# Patient Record
Sex: FEMALE | URBAN - METROPOLITAN AREA
[De-identification: names, ages, dates, MRNs, and addresses within clinical notes are randomized per-mention and may not be internally consistent; named-entity substitution may affect disease eponyms.]

---

## 2020-11-05 ENCOUNTER — APPOINTMENT (RX ONLY)
Dept: URBAN - METROPOLITAN AREA CLINIC 55 | Facility: CLINIC | Age: 40
Setting detail: DERMATOLOGY
End: 2020-11-05

## 2020-11-05 DIAGNOSIS — Z41.9 ENCOUNTER FOR PROCEDURE FOR PURPOSES OTHER THAN REMEDYING HEALTH STATE, UNSPECIFIED: ICD-10-CM

## 2020-11-05 PROCEDURE — ? CLEAR AND BRILLIANT LASER

## 2020-11-05 ASSESSMENT — LOCATION SIMPLE DESCRIPTION DERM: LOCATION SIMPLE: INFERIOR FOREHEAD

## 2020-11-05 ASSESSMENT — LOCATION DETAILED DESCRIPTION DERM: LOCATION DETAILED: INFERIOR MID FOREHEAD

## 2020-11-05 ASSESSMENT — LOCATION ZONE DERM: LOCATION ZONE: FACE

## 2020-11-05 NOTE — PROCEDURE: CLEAR AND BRILLIANT LASER
Consent: Written consent obtained, risks reviewed including but not limited to crusting, scabbing, blistering, scarring, darker or lighter pigmentary change, incidental hair removal, bruising, and/or incomplete removal.
Total Pulses: 6 overlapping passes
Length Of Topical Anesthesia Application (Optional): 60 minutes
Detail Level: Zone
Laser Type: Clear and Brilliant 1440nm
Topical Anesthesia?: 20% benzocaine, 8% lidocaine, 4% tetracaine
Energy Level: High
Pre-Procedure Text: Wiped off blt with gauze and cleansed skin. Went over with alcohol.  Patient requested PRONOX while she is getting her treatment.  Patient used PRONOX intermittently for 30 min while treatment being performed.
Post-Care Instructions: I reviewed with the patient in detail post-care instructions. Patient should stay away from the sun and wear sun protection until treated areas are fully healed.
Post-Procedure Care: C&E ferulic acid, Fusion spf 50. Post care reviewed with patient.

## 2020-11-24 ENCOUNTER — RX ONLY (OUTPATIENT)
Age: 40
Setting detail: RX ONLY
End: 2020-11-24

## 2020-11-24 ENCOUNTER — APPOINTMENT (RX ONLY)
Dept: URBAN - METROPOLITAN AREA CLINIC 55 | Facility: CLINIC | Age: 40
Setting detail: DERMATOLOGY
End: 2020-11-24

## 2020-11-24 DIAGNOSIS — Z41.9 ENCOUNTER FOR PROCEDURE FOR PURPOSES OTHER THAN REMEDYING HEALTH STATE, UNSPECIFIED: ICD-10-CM

## 2020-11-24 PROCEDURE — ? COSMETIC CONSULTATION: GENERAL

## 2020-11-24 RX ORDER — HYDROCODONE BITARTRATE AND ACETAMINOPHEN 5; 325 MG/1; MG/1
TABLET ORAL
Qty: 2 | Refills: 0 | Status: ERX | COMMUNITY
Start: 2020-11-24

## 2020-11-24 RX ORDER — ALPRAZOLAM 1 MG/1
TABLET ORAL
Qty: 1 | Refills: 0 | Status: ERX | COMMUNITY
Start: 2020-11-24

## 2020-12-03 ENCOUNTER — APPOINTMENT (RX ONLY)
Dept: URBAN - METROPOLITAN AREA CLINIC 55 | Facility: CLINIC | Age: 40
Setting detail: DERMATOLOGY
End: 2020-12-03

## 2020-12-03 DIAGNOSIS — Z41.9 ENCOUNTER FOR PROCEDURE FOR PURPOSES OTHER THAN REMEDYING HEALTH STATE, UNSPECIFIED: ICD-10-CM

## 2020-12-03 PROCEDURE — ? PALOMAR VECTUS LASER HAIR REMOVAL

## 2020-12-03 PROCEDURE — ? BOTOX

## 2020-12-03 NOTE — PROCEDURE: PALOMAR VECTUS LASER HAIR REMOVAL
Post-Care Instructions: I reviewed with the patient in detail post-care instructions. Patient should avoid sun for a minimum of 4 weeks before and after treatment.
Fluence In J/Cm2: 24
External Cooling Fan Speed: 0
Rate (Hz): Medium
Post-Procedure Care: Immediate endpoint: perifollicular erythema and edema. Post care was reviewed with the patient and all patient questions were answered to their satisfaction.
Optic Size: 12 x 12mm
Consent: Written consent obtained, risks reviewed including but not limited to crusting, scabbing, blistering, scarring, darker or lighter pigmentary change, paradoxical hair regrowth, incomplete removal of hair and infection.
Pulse Duration (Include Units): 44 ms
Laser Type: Alexandrite 755nm
Pulse Duration (Include Units): 17 ms
Fluence In J/Cm2: 26
Render Post-Care In The Note: No
Pre-Procedure: Prior to proceeding the treatment areas were cleaned and all present put on their eye protection.
Detail Level: Detailed
Treatment Number: 4
Location Override: Brazilian
Pulse Duration (Include Units): 16 ms
Fluence In J/Cm2: 12

## 2020-12-03 NOTE — PROCEDURE: BOTOX
Show Masseter Units: Yes
Post-Care Instructions: Patient instructed to not lie down for 4 hours and limit physical activity for 24 hours.
Nasal Root Units: 0
Glabellar Complex Units: 16
Additional Area 2 Location: Upper lip
Lot #: [id number]
Detail Level: Detailed
Show Right And Left Pupillary Line Units: No
Consent: Written consent obtained. Risks include but not limited to lid/brow ptosis, bruising, swelling, diplopia, temporary effect, incomplete chemical denervation.
Dilution (U/0.1 Cc): 4
Additional Area 1 Location: Lateral Brow
Expiration Date (Month Year): 9/30/2023

## 2020-12-11 ENCOUNTER — APPOINTMENT (RX ONLY)
Dept: URBAN - METROPOLITAN AREA CLINIC 55 | Facility: CLINIC | Age: 40
Setting detail: DERMATOLOGY
End: 2020-12-11

## 2020-12-11 DIAGNOSIS — Z41.9 ENCOUNTER FOR PROCEDURE FOR PURPOSES OTHER THAN REMEDYING HEALTH STATE, UNSPECIFIED: ICD-10-CM

## 2020-12-11 PROCEDURE — ? OTHER (COSMETIC)

## 2020-12-11 PROCEDURE — ? COSMETIC CONSULTATION: GENERAL

## 2021-01-12 ENCOUNTER — APPOINTMENT (RX ONLY)
Dept: URBAN - METROPOLITAN AREA CLINIC 55 | Facility: CLINIC | Age: 41
Setting detail: DERMATOLOGY
End: 2021-01-12

## 2021-01-12 DIAGNOSIS — Z41.9 ENCOUNTER FOR PROCEDURE FOR PURPOSES OTHER THAN REMEDYING HEALTH STATE, UNSPECIFIED: ICD-10-CM

## 2021-01-12 PROCEDURE — ? ULTHERAPY

## 2021-01-12 NOTE — PROCEDURE: ULTHERAPY
Treatment Number (Optional): 1
Anesthesia Type: 1% lidocaine with epinephrine
Patient Reported Discomfort: 2
Detail Level: Zone
Use Distraction Techniques In Note: No
Post-Care Instructions: I reviewed with the patient in detail post-care instructions. Patient should stay away from the sun and wear sun protection until treated areas are fully healed.
Anesthesia Volume In Cc: 6
Pre-Procedures Photographs: Yes
Other: See attachment for further documentation.
Consent: Written consent obtained, risks reviewed including but not limited to crusting, scabbing, blistering, scarring, darker or lighter pigmentary change, and/or incomplete improvement.

## 2021-01-14 ENCOUNTER — APPOINTMENT (RX ONLY)
Dept: URBAN - METROPOLITAN AREA CLINIC 55 | Facility: CLINIC | Age: 41
Setting detail: DERMATOLOGY
End: 2021-01-14

## 2021-01-14 DIAGNOSIS — Z41.9 ENCOUNTER FOR PROCEDURE FOR PURPOSES OTHER THAN REMEDYING HEALTH STATE, UNSPECIFIED: ICD-10-CM

## 2021-01-14 PROCEDURE — ? PALOMAR VECTUS LASER HAIR REMOVAL

## 2021-01-14 NOTE — PROCEDURE: PALOMAR VECTUS LASER HAIR REMOVAL
Optic Size: 12 x 12mm
Pre-Procedure: Prior to proceeding the treatment areas were cleaned and all present put on their eye protection.
Treatment Number: 5
Consent: Written consent obtained, risks reviewed including but not limited to crusting, scabbing, blistering, scarring, darker or lighter pigmentary change, paradoxical hair regrowth, incomplete removal of hair and infection.
Pulse Duration (Include Units): 17 ms
External Cooling Fan Speed: 0
Post-Care Instructions: I reviewed with the patient in detail post-care instructions. Patient should avoid sun for a minimum of 4 weeks before and after treatment.
Fluence In J/Cm2: 26
Post-Procedure Care: Immediate endpoint: perifollicular erythema and edema. Post care was reviewed with the patient and all patient questions were answered to their satisfaction.
Location Override: Brazilian
Laser Type: diode 810nm
Rate (Hz): Medium
Render Post-Care In The Note: No
Detail Level: Detailed

## 2021-01-18 ENCOUNTER — APPOINTMENT (RX ONLY)
Dept: URBAN - METROPOLITAN AREA CLINIC 55 | Facility: CLINIC | Age: 41
Setting detail: DERMATOLOGY
End: 2021-01-18

## 2021-01-18 DIAGNOSIS — Z41.9 ENCOUNTER FOR PROCEDURE FOR PURPOSES OTHER THAN REMEDYING HEALTH STATE, UNSPECIFIED: ICD-10-CM

## 2021-01-18 PROCEDURE — ? COOLSCULPTING

## 2021-01-18 NOTE — PROCEDURE: COOLSCULPTING
Detail Level: Simple
Time (Minutes - Will Only Render If Nonzero): 35
Suction Settings: The suction settings were per protocol.
Applicator Size: standard applicator
Location 4: left arm
Time (Minutes - Will Only Render If Nonzero): 0
Post Treatment: After treatment, the applicator was removed from the skin. 2 minute immediate post procedure massage was administered.
Intro: Prior to treatment, the area was cleaned with alcohol and marked out with a marking pen. The gel sheet was then applied uniformly. The applicator was applied to the skin with good contact.
Patient Weight (Optional): 173
Applicator Size: CoolAdvantage Core
Applicator Size: CoolAdvantage Fit
Device: Coolsculpting
Location 1: right arm
Consent: verbal consent obtained, risks reviewed including, but not limited to, blistering from suction, darker or lighter pigmentary change, bruising, and/or need for multiple treatments.

## 2021-02-01 ENCOUNTER — APPOINTMENT (RX ONLY)
Dept: URBAN - METROPOLITAN AREA CLINIC 55 | Facility: CLINIC | Age: 41
Setting detail: DERMATOLOGY
End: 2021-02-01

## 2021-02-01 DIAGNOSIS — Z41.9 ENCOUNTER FOR PROCEDURE FOR PURPOSES OTHER THAN REMEDYING HEALTH STATE, UNSPECIFIED: ICD-10-CM

## 2021-02-01 PROCEDURE — ? COOLSCULPTING

## 2021-02-01 NOTE — PROCEDURE: COOLSCULPTING
Location 2: inner thigh (left)
Applicator Size: CoolAdvantage Core
Suction Settings: The suction settings were per protocol.
Intro: Prior to treatment, the area was cleaned with alcohol and marked out with a marking pen. The gel sheet was then applied uniformly. The applicator was applied to the skin with good contact.
Time (Minutes - Will Only Render If Nonzero): 35
Post Treatment: After treatment, the applicator was removed from the skin. 2 minute immediate post procedure massage was administered.
Applicator Size: standard applicator
Time (Minutes - Will Only Render If Nonzero): 0
Detail Level: Simple
Location 1: inner thigh (right)
Device: Coolsculpting
Applicator Size: CoolAdvantage Fit
Consent: verbal consent obtained, risks reviewed including, but not limited to, blistering from suction, darker or lighter pigmentary change, bruising, and/or need for multiple treatments.

## 2021-04-14 ENCOUNTER — APPOINTMENT (RX ONLY)
Dept: URBAN - METROPOLITAN AREA CLINIC 55 | Facility: CLINIC | Age: 41
Setting detail: DERMATOLOGY
End: 2021-04-14

## 2021-04-14 DIAGNOSIS — Z41.9 ENCOUNTER FOR PROCEDURE FOR PURPOSES OTHER THAN REMEDYING HEALTH STATE, UNSPECIFIED: ICD-10-CM

## 2021-04-14 PROCEDURE — ? COOLSCULPTING

## 2021-04-14 NOTE — PROCEDURE: COOLSCULPTING
Applicator Size: standard applicator
Time (Minutes - Will Only Render If Nonzero): 35
Treatment Administered By (Optional): Nanci
Consent: verbal consent obtained, risks reviewed including, but not limited to, blistering from suction, darker or lighter pigmentary change, bruising, and/or need for multiple treatments.
Location 1: right arm
Detail Level: Simple
Applicator Size: CoolAdvantage Petite Core
Suction Settings: The suction settings were per protocol.
Applicator Size: CoolAdvantage Curve
Applicator Size: CoolAdvantage Fit
Location 3: upper back fat pad (left)
Intro: Prior to treatment, the area was cleaned with alcohol and marked out with a marking pen. The gel sheet was then applied uniformly. The applicator was applied to the skin with good contact.
Location 2: left arm
Device: Coolsculpting
Patient Weight (Optional): 167
Post Treatment: After treatment, the applicator was removed from the skin. 2 minute immediate post procedure massage was administered.
Time (Minutes - Will Only Render If Nonzero): 0
Location 4: upper back fat pad (right)

## 2021-04-16 ENCOUNTER — APPOINTMENT (RX ONLY)
Dept: URBAN - METROPOLITAN AREA CLINIC 55 | Facility: CLINIC | Age: 41
Setting detail: DERMATOLOGY
End: 2021-04-16

## 2021-04-16 DIAGNOSIS — Z41.9 ENCOUNTER FOR PROCEDURE FOR PURPOSES OTHER THAN REMEDYING HEALTH STATE, UNSPECIFIED: ICD-10-CM

## 2021-04-16 PROCEDURE — ? BOTOX

## 2021-04-16 NOTE — PROCEDURE: BOTOX
Show Additional Area 4: Yes
Show Lcl Units: No
Right Pupillary Line Units: 0
Consent: Written consent obtained. Risks include but not limited to lid/brow ptosis, bruising, swelling, diplopia, temporary effect, incomplete chemical denervation.
Periorbital Skin Units: 16
Additional Area 3 Location: Chin
Additional Area 2 Location: Upper lip
Lot #: [id number]
Detail Level: Detailed
Additional Area 1 Location: Left Lateral Brow
Expiration Date (Month Year): 12/2023
Dilution (U/0.1 Cc): 4
Post-Care Instructions: Patient instructed to not lie down for 4 hours and limit physical activity for 24 hours.

## 2021-05-08 ENCOUNTER — APPOINTMENT (RX ONLY)
Dept: URBAN - METROPOLITAN AREA CLINIC 55 | Facility: CLINIC | Age: 41
Setting detail: DERMATOLOGY
End: 2021-05-08

## 2021-05-08 DIAGNOSIS — Z41.9 ENCOUNTER FOR PROCEDURE FOR PURPOSES OTHER THAN REMEDYING HEALTH STATE, UNSPECIFIED: ICD-10-CM

## 2021-05-08 PROCEDURE — ? PALOMAR VECTUS LASER HAIR REMOVAL

## 2021-05-08 NOTE — PROCEDURE: PALOMAR VECTUS LASER HAIR REMOVAL
Pulse Duration (Include Units): 16 ms
Consent: Written consent obtained, risks reviewed including but not limited to crusting, scabbing, blistering, scarring, darker or lighter pigmentary change, paradoxical hair regrowth, incomplete removal of hair and infection.
Pre-Procedure: Prior to proceeding the treatment areas were cleaned and all present put on their eye protection.
Laser Type: diode 810nm
Render Post-Care In The Note: No
Detail Level: Detailed
External Cooling Fan Speed: 0
Rate (Hz): Medium
Fluence In J/Cm2: 24
Post-Procedure Care: Immediate endpoint: perifollicular erythema and edema. Post care was reviewed with the patient and all patient questions were answered to their satisfaction.
Location Override: Brazilian
Post-Care Instructions: I reviewed with the patient in detail post-care instructions. Patient should avoid sun for a minimum of 4 weeks before and after treatment.
Optic Size: 12 x 12mm
Treatment Number: 6

## 2021-06-08 ENCOUNTER — APPOINTMENT (RX ONLY)
Dept: URBAN - METROPOLITAN AREA CLINIC 55 | Facility: CLINIC | Age: 41
Setting detail: DERMATOLOGY
End: 2021-06-08

## 2021-06-08 DIAGNOSIS — Z41.9 ENCOUNTER FOR PROCEDURE FOR PURPOSES OTHER THAN REMEDYING HEALTH STATE, UNSPECIFIED: ICD-10-CM

## 2021-06-08 PROCEDURE — ? COOLSCULPTING

## 2021-06-08 NOTE — PROCEDURE: COOLSCULPTING
Time (Minutes - Will Only Render If Nonzero): 35
Consent obtained, risks reviewed.
Applicator Size: CoolAdvantage Fit
Applicator Size: CoolAdvantage Curve
Time (Minutes - Will Only Render If Nonzero): 0
Suction Settings: The suction settings were per protocol.
Treatment Administered By (Optional): Nanci
Detail Level: Simple
Location 1: inner thigh (left)
Applicator Size: CoolAdvantage Core
Patient Weight (Optional): 168
Applicator Size: standard applicator
Device: Coolsculpting
Post Treatment: After treatment, the applicator was removed from the skin. 2 minute immediate post procedure massage was administered.
Price (Use Numbers Only, No Special Characters Or $): 1000
Intro: Prior to treatment, the area was cleaned with alcohol and marked out with a marking pen. The gel sheet was then applied uniformly. The applicator was applied to the skin with good contact.
Location 2: inner thigh (right)

## 2021-06-15 ENCOUNTER — APPOINTMENT (RX ONLY)
Dept: URBAN - METROPOLITAN AREA CLINIC 55 | Facility: CLINIC | Age: 41
Setting detail: DERMATOLOGY
End: 2021-06-15

## 2021-06-15 DIAGNOSIS — Z41.9 ENCOUNTER FOR PROCEDURE FOR PURPOSES OTHER THAN REMEDYING HEALTH STATE, UNSPECIFIED: ICD-10-CM

## 2021-06-15 PROCEDURE — ? COOLSCULPTING

## 2021-06-15 NOTE — PROCEDURE: COOLSCULPTING
Time (Minutes - Will Only Render If Nonzero): 35
Applicator Size: CoolAdvantage Curve
Suction Settings: The suction settings were per protocol.
Time (Minutes - Will Only Render If Nonzero): 0
Applicator Size: standard applicator
Consent obtained, risks reviewed.
Applicator Size: CoolAdvantage Core
Intro: Prior to treatment, the area was cleaned with alcohol and marked out with a marking pen. The gel sheet was then applied uniformly. The applicator was applied to the skin with good contact.
Device: Coolsculpting
Applicator Size: Faveeo PRO applicator
Post Treatment: After treatment, the applicator was removed from the skin. 2 minute immediate post procedure massage was administered.
Price (Use Numbers Only, No Special Characters Or $): 1000
Time (Minutes - Will Only Render If Nonzero): 75
Detail Level: Simple
Location 2: flank (left)
Applicator Size: CoolAdvantage Fit
Location 1: flank (right)
Treatment Administered By (Optional): Nanci

## 2021-07-13 ENCOUNTER — RX ONLY (OUTPATIENT)
Age: 41
Setting detail: RX ONLY
End: 2021-07-13

## 2021-08-18 ENCOUNTER — APPOINTMENT (RX ONLY)
Dept: URBAN - METROPOLITAN AREA CLINIC 55 | Facility: CLINIC | Age: 41
Setting detail: DERMATOLOGY
End: 2021-08-18

## 2021-08-18 DIAGNOSIS — Z41.9 ENCOUNTER FOR PROCEDURE FOR PURPOSES OTHER THAN REMEDYING HEALTH STATE, UNSPECIFIED: ICD-10-CM

## 2021-08-18 PROCEDURE — ? BOTOX

## 2021-08-18 NOTE — PROCEDURE: BOTOX
Show Mentalis Units: No
Anterior Platysmal Bands Units: 0
Show Anterior Platysmal Band Units: Yes
Additional Area 2 Location: Upper lip
Additional Area 3 Location: Chin
Glabellar Complex Units: 16
Dilution (U/0.1 Cc): 4
Additional Area 1 Location: Left Lateral Brow
Lot #: [id number]
Consent: Written consent obtained. Risks include but not limited to lid/brow ptosis, bruising, swelling, diplopia, temporary effect, incomplete chemical denervation.
Post-Care Instructions: Patient instructed to not lie down for 4 hours and limit physical activity for 24 hours.
Detail Level: Detailed
Expiration Date (Month Year): 11/2023

## 2022-01-25 ENCOUNTER — APPOINTMENT (RX ONLY)
Dept: URBAN - METROPOLITAN AREA CLINIC 55 | Facility: CLINIC | Age: 42
Setting detail: DERMATOLOGY
End: 2022-01-25

## 2022-01-25 DIAGNOSIS — Z41.9 ENCOUNTER FOR PROCEDURE FOR PURPOSES OTHER THAN REMEDYING HEALTH STATE, UNSPECIFIED: ICD-10-CM

## 2022-01-25 PROCEDURE — ? BOTOX

## 2022-01-25 NOTE — PROCEDURE: BOTOX
Show Glabellar Units: Yes
Right Periorbital Skin Units: 0
Additional Area 2 Location: Upper lip
Show Right And Left Brow Units: No
Additional Area 4 Location: upper cutaneous lip
Additional Area 3 Location: Chin
Dilution (U/0.1 Cc): 4
Consent: Written consent obtained. Risks include but not limited to lid/brow ptosis, bruising, swelling, diplopia, temporary effect, incomplete chemical denervation.
Detail Level: Detailed
Lot #: [id number]
Additional Area 1 Location: Left Lateral Brow
Periorbital Skin Units: 16
Post-Care Instructions: Patient instructed to not lie down for 4 hours and limit physical activity for 24 hours.
Expiration Date (Month Year): 4/2024

## 2022-06-22 ENCOUNTER — APPOINTMENT (RX ONLY)
Dept: URBAN - METROPOLITAN AREA CLINIC 55 | Facility: CLINIC | Age: 42
Setting detail: DERMATOLOGY
End: 2022-06-22

## 2022-06-22 DIAGNOSIS — Z41.9 ENCOUNTER FOR PROCEDURE FOR PURPOSES OTHER THAN REMEDYING HEALTH STATE, UNSPECIFIED: ICD-10-CM

## 2022-06-22 PROCEDURE — ? BOTOX

## 2022-06-22 PROCEDURE — ? COSMETIC CONSULTATION: GENERAL

## 2022-06-22 NOTE — PROCEDURE: BOTOX
Post-Care Instructions: Patient instructed to not lie down for 4 hours and limit physical activity for 24 hours.
Show Additional Area 1: Yes
Additional Area 2 Location: Upper lip
Lateral Platysmal Bands Units: 0
Show Lcl Units: No
Glabellar Complex Units: 16
Expiration Date (Month Year): 4/2024
Lot #: [id number]
Additional Area 3 Location: Chin
Additional Area 5 Location: lower eye lids
Consent: Written consent obtained. Risks include but not limited to lid/brow ptosis, bruising, swelling, diplopia, temporary effect, incomplete chemical denervation.
Forehead Units: 4
Detail Level: Detailed
Additional Area 1 Location: Right Lateral Brow

## 2022-07-01 ENCOUNTER — APPOINTMENT (RX ONLY)
Dept: URBAN - METROPOLITAN AREA CLINIC 55 | Facility: CLINIC | Age: 42
Setting detail: DERMATOLOGY
End: 2022-07-01

## 2022-07-01 DIAGNOSIS — Z41.9 ENCOUNTER FOR PROCEDURE FOR PURPOSES OTHER THAN REMEDYING HEALTH STATE, UNSPECIFIED: ICD-10-CM

## 2022-07-01 PROCEDURE — ? FILLERS

## 2022-07-01 NOTE — PROCEDURE: FILLERS
Cheeks Filler Volume In Cc: 0
Include Cannula Information In Note?: No
Map Statment: See Attach Map for Complete Details
Expiration Date (Month Year): 5/31/2024
Post-Care Instructions: Patient instructed to apply ice to reduce swelling.
Include Cannula Information In Note?: Yes
Additional Area 1 Location: lip body
Include Cannula Size?: 27G
Anesthesia Volume In Cc: 0.5
Lot #: [id number]
Additional Anesthesia Volume In Cc: 6
Expiration Date (Month Year): 10/31/2023
Lot #: [id number]
Filler: Luciana Hebert
Anesthesia Type: 1% lidocaine with epinephrine
Detail Level: Detailed
Consent: Verbal consent obtained. Risks include but not limited to bruising, beading, irregular texture, ulceration, infection, allergic reaction, scar formation, incomplete augmentation, temporary nature, procedural pain.
Expiration Date (Month Year): 11/11/2022
Aspiration Statement: Aspiration was performed prior to injecting site with filler.
Lot #: [id number]

## 2022-07-19 ENCOUNTER — APPOINTMENT (RX ONLY)
Dept: URBAN - METROPOLITAN AREA CLINIC 55 | Facility: CLINIC | Age: 42
Setting detail: DERMATOLOGY
End: 2022-07-19

## 2022-07-19 DIAGNOSIS — Z41.9 ENCOUNTER FOR PROCEDURE FOR PURPOSES OTHER THAN REMEDYING HEALTH STATE, UNSPECIFIED: ICD-10-CM

## 2022-07-19 PROCEDURE — ? FILLERS

## 2022-07-19 NOTE — PROCEDURE: FILLERS
Brows Filler Volume In Cc: 0
Include Cannula Size?: 27G
Consent: Verbal consent obtained. Risks include but not limited to bruising, beading, irregular texture, ulceration, infection, allergic reaction, scar formation, incomplete augmentation, temporary nature, procedural pain.
Lot #: [id number]
Additional Area 1 Location: lip body
Anesthesia Volume In Cc: 0.5
Expiration Date (Month Year): 5/31/2024
Aspiration Statement: Aspiration was performed prior to injecting site with filler.
Post-Care Instructions: Patient instructed to apply ice to reduce swelling.
Include Cannula Information In Note?: Yes
Anesthesia Type: 1% lidocaine with epinephrine
Map Statment: See Attach Map for Complete Details
Detail Level: Detailed
Expiration Date (Month Year): 9/30/2024
Filler: Restylane Kysse
Additional Anesthesia Volume In Cc: 6
Expiration Date (Month Year): 11/11/2022
Use Map Statement For Sites (Optional): No
Lot #: [id number]
Lot #: [id number]

## 2022-12-13 ENCOUNTER — APPOINTMENT (RX ONLY)
Dept: URBAN - METROPOLITAN AREA CLINIC 55 | Facility: CLINIC | Age: 42
Setting detail: DERMATOLOGY
End: 2022-12-13

## 2022-12-13 DIAGNOSIS — Z41.9 ENCOUNTER FOR PROCEDURE FOR PURPOSES OTHER THAN REMEDYING HEALTH STATE, UNSPECIFIED: ICD-10-CM

## 2022-12-13 PROCEDURE — ? BOTOX

## 2022-12-13 NOTE — PROCEDURE: BOTOX
Show Topical Anesthesia: Yes
Show Right And Left Brow Units: No
Dilution (U/0.1 Cc): 4
Additional Area 3 Units: 0
Glabellar Complex Units: 16
Additional Area 1 Location: upper lip
Detail Level: Detailed
Additional Area 2 Location: chin
Show Inventory Tab: Show
Post-Care Instructions: Patient instructed to not lie down for 4 hours and limit physical activity for 24 hours.
Consent: Verbal consent obtained. Risks include but not limited to lid/brow ptosis, bruising, swelling, diplopia, temporary effect, incomplete chemical denervation.

## 2023-01-27 ENCOUNTER — RX ONLY (OUTPATIENT)
Age: 43
Setting detail: RX ONLY
End: 2023-01-27

## 2023-01-27 RX ORDER — HYDROCODONE BITARTRATE AND ACETAMINOPHEN 5; 325 MG/1; MG/1
TABLET ORAL
Qty: 2 | Refills: 0 | Status: ERX | COMMUNITY
Start: 2023-01-27

## 2023-01-27 RX ORDER — ALPRAZOLAM 1 MG/1
TABLET ORAL
Qty: 1 | Refills: 0 | Status: ERX | COMMUNITY
Start: 2023-01-27

## 2023-01-31 ENCOUNTER — APPOINTMENT (RX ONLY)
Dept: URBAN - METROPOLITAN AREA CLINIC 55 | Facility: CLINIC | Age: 43
Setting detail: DERMATOLOGY
End: 2023-01-31

## 2023-01-31 DIAGNOSIS — Z41.9 ENCOUNTER FOR PROCEDURE FOR PURPOSES OTHER THAN REMEDYING HEALTH STATE, UNSPECIFIED: ICD-10-CM

## 2023-01-31 PROCEDURE — ? INVENTORY

## 2023-01-31 PROCEDURE — ? PRO-NOX

## 2023-01-31 PROCEDURE — ? ULTHERAPY

## 2023-01-31 NOTE — PROCEDURE: PRO-NOX
Consent obtained, risks reviewed.
Post-Care Instructions: Post care reviewed. Ice pack provided.
Detail Level: Zone
Total Time (Leave Blank If Not Wanted): 130 mins
Pre-Procedure Text: The patient was connected to the Pro-Nox machine and positioned appropriately for the anticipated procedure.  Following the procedure they were disconnected and monitored for any lasting side effects prior to leaving the office.

## 2023-05-16 ENCOUNTER — APPOINTMENT (RX ONLY)
Dept: URBAN - METROPOLITAN AREA CLINIC 55 | Facility: CLINIC | Age: 43
Setting detail: DERMATOLOGY
End: 2023-05-16

## 2023-05-16 DIAGNOSIS — Z41.9 ENCOUNTER FOR PROCEDURE FOR PURPOSES OTHER THAN REMEDYING HEALTH STATE, UNSPECIFIED: ICD-10-CM

## 2023-05-16 PROCEDURE — ? BOTOX

## 2023-05-16 NOTE — PROCEDURE: BOTOX
Left Periorbital Skin Units: 0
Show Ucl Units: No
Incrementing Botox Units: By 0.5 Units
Show Masseter Units: Yes
Detail Level: Detailed
Additional Area 2 Location: chin
Forehead Units: 4
Glabellar Complex Units: 16
Show Inventory Tab: Show
Additional Area 1 Location: upper lip
Consent: Verbal consent obtained. Risks include but not limited to lid/brow ptosis, bruising, swelling, diplopia, temporary effect, incomplete chemical denervation.
Post-Care Instructions: Patient instructed to not lie down for 4 hours and limit physical activity for 24 hours.
Additional Area 3 Location: lower lip

## 2023-06-05 ENCOUNTER — APPOINTMENT (RX ONLY)
Dept: URBAN - METROPOLITAN AREA CLINIC 55 | Facility: CLINIC | Age: 43
Setting detail: DERMATOLOGY
End: 2023-06-05

## 2023-06-05 DIAGNOSIS — Z41.9 ENCOUNTER FOR PROCEDURE FOR PURPOSES OTHER THAN REMEDYING HEALTH STATE, UNSPECIFIED: ICD-10-CM

## 2023-06-05 PROCEDURE — ? FILLERS

## 2023-06-05 PROCEDURE — ? INVENTORY

## 2023-06-05 PROCEDURE — ? DYSPORT

## 2023-06-05 NOTE — PROCEDURE: FILLERS
Additional Area 3 Volume In Cc: 0
Include Cannula Information In Note?: Yes
Anesthesia Volume In Cc: 0.5
Anesthesia Type: 1% lidocaine with epinephrine
Filler: Restylane Contour
Include Cannula Information In Note?: No
Include Cannula Size?: 25G
Map Statment: See Attach Map for Complete Details
Post-Care Instructions: After the procedure, patient instructed to apply ice to reduce swelling.
Consent: Written consent obtained. Risks include but not limited to bruising, beading, irregular texture, ulceration, infection, allergic reaction, scar formation, incomplete augmentation, temporary nature, and procedural pain.
Detail Level: Detailed
Additional Anesthesia Volume In Cc: 6

## 2023-06-05 NOTE — PROCEDURE: DYSPORT
Show Lcl Units: No
Periorbital Skin Units: 0
Post-Care Instructions: Patient instructed to not lie down for 4 hours and limit physical activity for 24 hours.
Show Anterior Platysmal Band Units: Yes
Consent: Verbal consent obtained. Risks include but not limited to lid/brow ptosis, bruising, swelling, diplopia, temporary effect, incomplete chemical denervation.
Show Inventory Tab: Show
Depressor Anguli Oris Units: 15
Additional Area 2 Location: chin
Detail Level: Detailed
Additional Area 1 Location: Upper lip
Dilution (U/0.1 Cc): 10

## 2023-07-10 ENCOUNTER — APPOINTMENT (RX ONLY)
Dept: URBAN - METROPOLITAN AREA CLINIC 55 | Facility: CLINIC | Age: 43
Setting detail: DERMATOLOGY
End: 2023-07-10

## 2023-07-10 DIAGNOSIS — Z41.9 ENCOUNTER FOR PROCEDURE FOR PURPOSES OTHER THAN REMEDYING HEALTH STATE, UNSPECIFIED: ICD-10-CM

## 2023-07-10 PROCEDURE — ? IN-HOUSE DISPENSING PHARMACY

## 2023-07-10 PROCEDURE — ? INVENTORY

## 2023-07-10 NOTE — PROCEDURE: IN-HOUSE DISPENSING PHARMACY
Product 12 Unit Type: mg
Product 41 Price/Unit (In Dollars): 0
Product 6 Application Directions: Apply nightly or as directed. Use SPF daily.
Name Of Product 1: Anti-Aging Brightening Cream
Product 4 Amount/Unit (Numbers Only): 30
Product 2 Refills: 11
Product 3 Unit Type: ml
Product 7 Units Dispensed: 1
Render Product Pricing In Note: Yes
Name Of Product 5: Hydrating Tretinoin 0.025% Cream
Name Of Product 4: Hydroquinone 8% Emulsion
Product 2 Application Directions: Apply nightly or as directed.
Product 1 Refills: 2
Name Of Product 7: Lidocaine 23% / Tetracaine 7% Cream
Name Of Product 6: Rosacea Triple Combination Gel
Detail Level: Zone
Name Of Product 2: Dapsone / Spironolactone Gel
Product 7 Application Directions: Apply only as directed
Send Charges To Patient Encounter: No
Name Of Product 3: Acne Triple Combination Gel

## 2023-07-10 NOTE — PROCEDURE: IN-HOUSE DISPENSING PHARMACY
Product 25 Unit Type: mg
Product 6 Application Directions: Apply nightly or as directed. Use SPF daily.
Product 78 Units Dispensed: 0
Product 7 Refills: 2
Product 1 Unit Type: ml
Name Of Product 2: Dapsone / Spironolactone Gel
Render Product Pricing In Note: Yes
Product 6 Amount/Unit (Numbers Only): 30
Detail Level: Zone
Name Of Product 1: Anti-Aging Brightening Cream
Name Of Product 6: Rosacea Triple Combination Gel
Send Charges To Patient Encounter: No
Product 4 Application Directions: Apply nightly or as directed.
Product 5 Refills: 11
Name Of Product 7: Lidocaine 23% / Tetracaine 7% Cream
Name Of Product 5: Hydrating Tretinoin 0.025% Cream
Product 4 Units Dispensed: 1
Product 7 Application Directions: Apply only as directed
Name Of Product 4: Hydroquinone 8% Emulsion
Name Of Product 3: Acne Triple Combination Gel

## 2023-08-02 ENCOUNTER — APPOINTMENT (RX ONLY)
Dept: URBAN - METROPOLITAN AREA CLINIC 55 | Facility: CLINIC | Age: 43
Setting detail: DERMATOLOGY
End: 2023-08-02

## 2023-08-02 DIAGNOSIS — Z41.9 ENCOUNTER FOR PROCEDURE FOR PURPOSES OTHER THAN REMEDYING HEALTH STATE, UNSPECIFIED: ICD-10-CM

## 2023-08-02 PROCEDURE — ? IN-HOUSE DISPENSING PHARMACY

## 2023-08-02 PROCEDURE — ? INVENTORY

## 2023-08-02 NOTE — PROCEDURE: IN-HOUSE DISPENSING PHARMACY
Product 56 Unit Type: mg
Product 33 Amount/Unit (Numbers Only): 0
Product 4 Unit Type: ml
Product 3 Amount/Unit (Numbers Only): 30
Product 1 Refills: 2
Render Refills If Set To 0: Yes
Product 2 Refills: 11
Name Of Product 7: Lidocaine 23% / Tetracaine 7% Cream
Product 5 Application Directions: Apply nightly or as directed. Use SPF daily.
Product 4 Units Dispensed: 1
Send Charges To Patient Encounter: No
Name Of Product 4: Hydroquinone 8% Emulsion
Name Of Product 6: Rosacea Triple Combination Gel
Product 2 Application Directions: Apply nightly or as directed.
Name Of Product 1: Anti-Aging Brightening Cream
Detail Level: Zone
Name Of Product 2: Dapsone / Spironolactone Gel
Product 7 Application Directions: Apply only as directed
Name Of Product 5: Hydrating Tretinoin 0.025% Cream
Name Of Product 3: Acne Triple Combination Gel

## 2023-08-15 ENCOUNTER — APPOINTMENT (RX ONLY)
Dept: URBAN - METROPOLITAN AREA CLINIC 55 | Facility: CLINIC | Age: 43
Setting detail: DERMATOLOGY
End: 2023-08-15

## 2023-08-15 DIAGNOSIS — Z41.9 ENCOUNTER FOR PROCEDURE FOR PURPOSES OTHER THAN REMEDYING HEALTH STATE, UNSPECIFIED: ICD-10-CM

## 2023-08-15 PROCEDURE — ? FRAXEL

## 2023-08-15 PROCEDURE — ? INVENTORY

## 2023-08-15 PROCEDURE — ? PRO-NOX

## 2023-08-15 PROCEDURE — ? FILLERS

## 2023-08-15 NOTE — PROCEDURE: FRAXEL
Location: decolletage of the chest
Number Of Passes: 1
Anesthesia Type: 1% lidocaine with epinephrine
Location: full face
Energy(Mj/Cm2): 15
Large Metal Eye Shield Text: The ocular mucosa was anesthetized with tetracaine. Once adequate anesthesia was optained, large metal eye shields were inserted and remained in place until the procedure was completed.
Depth In Microns (Use Numbers Only, No Special Characters Or $): 202
Total Coverage: 30%
Length Of Topical Anesthesia Application (Optional): 60 minutes
Indication: dyschromia
Number Of Passes: 4
Treatment Number: 3
Number Of Passes: 8
Small Plastic Eye Shield Text: The ocular mucosa was anesthetized with tetracaine. Once adequate anesthesia was optained, small plastic eye shields were inserted and remained in place until the procedure was completed.
Topical Anesthesia Type: 23% lidocaine, 7% tetracaine
External Cooling Fan Speed: 5
Wavelength: 1927nm
Medium Plastic Eye Shield Text: The ocular mucosa was anesthetized with tetracaine. Once adequate anesthesia was optained, medium plastic eye shields were inserted and remained in place until the procedure was completed.
Total Coverage: 35%
Add Post-Care Below To The Note: Yes
Small Metal Eye Shield Text: The ocular mucosa was anesthetized with tetracaine. Once adequate anesthesia was optained, small metal eye shields were inserted and remained in place until the procedure was completed.
Was An Eye Shield Used?: No
Depth In Microns (Use Numbers Only, No Special Characters Or $): 199
Location: Use Location Override
Consent obtained, risks reviewed.
Anesthesia Volume In Cc: 0.3
Post-Care Instructions: Topical anesthetic removed with gauze. Skin cleansed with a gentle  and prepped with Hibiclens.\\nCO2 lift mask applied post treatment and advised to keep on for 1 hour. Ice packs sent home with patient. \\nI reviewed with the patient in detail post-care instructions. Patient should avoid sun until area fully healed.
Total Energy In Kj (Optional- Don't Include Units): 1.54
Large Plastic Eye Shield Text: The ocular mucosa was anesthetized with tetracaine. Once adequate anesthesia was optained, large plastic eye shields were inserted and remained in place until the procedure was completed.
Medium Metal Eye Shield Text: The ocular mucosa was anesthetized with tetracaine. Once adequate anesthesia was optained, medium metal eye shields were inserted and remained in place until the procedure was completed.
Energy(Mj/Cm2): 20
Detail Level: Zone

## 2023-08-15 NOTE — PROCEDURE: FILLERS
Decollete Filler Volume In Cc: 0
Consent: Written consent obtained. Risks include but not limited to bruising, beading, irregular texture, ulceration, infection, allergic reaction, scar formation, incomplete augmentation, temporary nature, and procedural pain.
Anesthesia Type: 1% lidocaine with epinephrine
Include Cannula Size?: 27G
Post-Care Instructions: After the procedure, patient instructed to apply ice to reduce swelling.
Anesthesia Volume In Cc: 0.5
Additional Anesthesia Volume In Cc: 6
Include Cannula Information In Note?: No
Filler: RHA Redensity
Detail Level: Detailed
Map Statment: See Attach Map for Complete Details
Filler: RHA 3
Include Cannula Information In Note?: Yes

## 2023-10-25 ENCOUNTER — APPOINTMENT (RX ONLY)
Dept: URBAN - METROPOLITAN AREA CLINIC 55 | Facility: CLINIC | Age: 43
Setting detail: DERMATOLOGY
End: 2023-10-25

## 2023-10-25 DIAGNOSIS — Z41.9 ENCOUNTER FOR PROCEDURE FOR PURPOSES OTHER THAN REMEDYING HEALTH STATE, UNSPECIFIED: ICD-10-CM

## 2023-10-25 PROCEDURE — ? IN-HOUSE DISPENSING PHARMACY

## 2023-10-25 PROCEDURE — ? BOTOX

## 2023-10-25 PROCEDURE — ? INVENTORY

## 2023-10-25 NOTE — PROCEDURE: BOTOX
Anterior Platysmal Bands Units: 0
Show Additional Area 3: Yes
Show Ucl Units: No
Additional Area 2 Location: chin
Periorbital Skin Units: 16
Incrementing Botox Units: By 0.5 Units
Additional Area 1 Location: upper lip
Detail Level: Detailed
Forehead Units: 4
Consent: Verbal consent obtained. Risks include but not limited to lid/brow ptosis, bruising, swelling, diplopia, temporary effect, incomplete chemical denervation.
Show Inventory Tab: Show
Post-Care Instructions: Patient instructed to not lie down for 4 hours and limit physical activity for 24 hours.
Additional Area 3 Location: lower lip
Depressor Anguli Oris Units: 6

## 2023-10-25 NOTE — PROCEDURE: IN-HOUSE DISPENSING PHARMACY
Product 66 Refills: 0
Send Charges To Patient Encounter: No
Name Of Product 5: Hydrating Tretinoin 0.025% Cream
Product 43 Unit Type: mg
Product 4 Units Dispensed: 1
Product 2 Amount/Unit (Numbers Only): 30
Render Refills If Set To 0: Yes
Product 5 Refills: 11
Product 4 Refills: 2
Product 5 Unit Type: ml
Detail Level: Zone
Product 3 Application Directions: Apply nightly or as directed. Use SPF daily.
Name Of Product 2: Dapsone / Spironolactone Gel
Name Of Product 1: Anti-Aging Brightening Cream
Product 2 Application Directions: Apply nightly or as directed.
Name Of Product 7: Lidocaine 23% / Tetracaine 7% Cream
Name Of Product 6: Rosacea Triple Combination Gel
Name Of Product 4: Hydroquinone 8% Emulsion
Name Of Product 3: Acne Triple Combination Gel
Product 7 Application Directions: Apply only as directed

## 2024-04-02 ENCOUNTER — APPOINTMENT (RX ONLY)
Dept: URBAN - METROPOLITAN AREA CLINIC 55 | Facility: CLINIC | Age: 44
Setting detail: DERMATOLOGY
End: 2024-04-02

## 2024-04-02 DIAGNOSIS — Z41.9 ENCOUNTER FOR PROCEDURE FOR PURPOSES OTHER THAN REMEDYING HEALTH STATE, UNSPECIFIED: ICD-10-CM

## 2024-04-02 PROCEDURE — ? DYSPORT

## 2024-04-02 NOTE — PROCEDURE: DYSPORT
Glabellar Complex Units: 40
Show Topical Anesthesia: Yes
Show Mentalis Units: No
Additional Area 5 Units: 0
Additional Area 1 Location: Upper lip
Forehead Units: 10
Detail Level: Detailed
Show Inventory Tab: Show
Depressor Anguli Oris Units: 15
Additional Area 2 Location: chin
Consent: Verbal consent obtained. Risks include but not limited to lid/brow ptosis, bruising, swelling, diplopia, temporary effect, incomplete chemical denervation.
Post-Care Instructions: Patient instructed to not lie down for 4 hours and limit physical activity for 24 hours.

## 2024-08-16 ENCOUNTER — APPOINTMENT (RX ONLY)
Dept: URBAN - METROPOLITAN AREA CLINIC 55 | Facility: CLINIC | Age: 44
Setting detail: DERMATOLOGY
End: 2024-08-16

## 2024-08-16 DIAGNOSIS — Z41.9 ENCOUNTER FOR PROCEDURE FOR PURPOSES OTHER THAN REMEDYING HEALTH STATE, UNSPECIFIED: ICD-10-CM

## 2024-08-16 PROCEDURE — ? INVENTORY

## 2024-08-16 PROCEDURE — ? IN-HOUSE DISPENSING PHARMACY

## 2024-08-16 NOTE — PROCEDURE: IN-HOUSE DISPENSING PHARMACY
Product 43 Refills: 0
Render Refills If Set To 0: Yes
Product 74 Unit Type: mg
Product 3 Unit Type: ml
Product 1 Application Directions: Apply nightly or as directed. Use SPF daily.
Send Charges To Patient Encounter: No
Product 2 Refills: 11
Product 7 Refills: 2
Product 4 Amount/Unit (Numbers Only): 30
Name Of Product 3: Acne Triple Combination Gel
Detail Level: Zone
Product 2 Application Directions: Apply nightly or as directed.
Name Of Product 5: Hydrating Tretinoin 0.025% Cream
Product 7 Application Directions: Apply only as directed
Name Of Product 6: Rosacea Triple Combination Gel
Name Of Product 1: Anti-Aging Brightening Cream
Name Of Product 2: Dapsone / Spironolactone Gel
Name Of Product 4: Hydroquinone 8% Emulsion
Name Of Product 7: Lidocaine 23% / Tetracaine 7% Cream
Product 4 Units Dispensed: 1

## 2024-08-20 ENCOUNTER — APPOINTMENT (RX ONLY)
Dept: URBAN - METROPOLITAN AREA CLINIC 55 | Facility: CLINIC | Age: 44
Setting detail: DERMATOLOGY
End: 2024-08-20

## 2024-08-20 DIAGNOSIS — Z41.9 ENCOUNTER FOR PROCEDURE FOR PURPOSES OTHER THAN REMEDYING HEALTH STATE, UNSPECIFIED: ICD-10-CM

## 2024-08-20 PROCEDURE — ? DYSPORT

## 2024-08-20 NOTE — PROCEDURE: DYSPORT
Medication refills per primary care.  Follow-up with primary care for blood pressure management.    No driving.  No alcohol.  
Show Lateral Platysmal Band Units: Yes
Lcl Root Units: 0
Show Right And Left Brow Units: No
Periorbital Skin Units: 40
Dilution (U/0.1 Cc): 10
Additional Area 3 Location: DLI
Post-Care Instructions: Patient instructed to not lie down for 4 hours and limit physical activity for 24 hours.
Show Inventory Tab: Show
Depressor Anguli Oris Units: 15
Consent: Verbal consent obtained. Risks include but not limited to lid/brow ptosis, bruising, swelling, diplopia, temporary effect, incomplete chemical denervation.
Additional Area 2 Location: chin
Detail Level: Detailed
Additional Area 1 Location: Upper lip

## 2025-01-07 ENCOUNTER — APPOINTMENT (OUTPATIENT)
Dept: URBAN - METROPOLITAN AREA CLINIC 55 | Facility: CLINIC | Age: 45
Setting detail: DERMATOLOGY
End: 2025-01-07

## 2025-01-07 DIAGNOSIS — Z41.9 ENCOUNTER FOR PROCEDURE FOR PURPOSES OTHER THAN REMEDYING HEALTH STATE, UNSPECIFIED: ICD-10-CM

## 2025-01-07 PROCEDURE — ? DYSPORT

## 2025-01-07 NOTE — PROCEDURE: DYSPORT
Dilution (U/0.1 Cc): 10
Show Additional Area 5: Yes
Additional Area 3 Units: 0
Glabellar Complex Units: 40
Detail Level: Detailed
Show Lcl Units: No
Show Inventory Tab: Show
Additional Area 1 Location: Chin
Depressor Anguli Oris Units: 15
Consent was obtained.
Post-Care Instructions: Patient instructed to not lie down for 4 hours and limit physical activity for 24 hours.

## 2025-06-17 ENCOUNTER — APPOINTMENT (OUTPATIENT)
Dept: URBAN - METROPOLITAN AREA CLINIC 55 | Facility: CLINIC | Age: 45
Setting detail: DERMATOLOGY
End: 2025-06-17

## 2025-06-17 DIAGNOSIS — Z41.9 ENCOUNTER FOR PROCEDURE FOR PURPOSES OTHER THAN REMEDYING HEALTH STATE, UNSPECIFIED: ICD-10-CM

## 2025-06-17 PROCEDURE — ? INVENTORY

## 2025-06-17 PROCEDURE — ? FILLERS

## 2025-06-17 PROCEDURE — ? DYSPORT

## 2025-06-17 NOTE — PROCEDURE: DYSPORT
Detail Level: Detailed
R Brow Units: 0
Show Lcl Units: No
Glabellar Complex Units: 40
Show Additional Area 2: Yes
Show Inventory Tab: Show
Depressor Anguli Oris Units: 15
Consent was obtained.
Post-Care Instructions: Patient instructed to not lie down for 4 hours and limit physical activity for 24 hours.
Dilution (U/0.1 Cc): 10
Additional Area 1 Location: Chin

## 2025-06-17 NOTE — PROCEDURE: FILLERS
[place] Filler Volume In Cc: 0
Consent was obtained.
Include Cannula Information In Note?: No
Post-Care Instructions: After care instructions were provided verbally and in writing.
Include Cannula Size?: 25G
Filler: Restylane Contour
Map Statment: See Attach Map for Complete Details
Filler: Restylane Defyne
Anesthesia Type: 1% lidocaine with epinephrine
Anesthesia Volume In Cc: 0.5
Include Cannula Information In Note?: Yes
Detail Level: Detailed